# Patient Record
Sex: FEMALE | Race: WHITE | URBAN - METROPOLITAN AREA
[De-identification: names, ages, dates, MRNs, and addresses within clinical notes are randomized per-mention and may not be internally consistent; named-entity substitution may affect disease eponyms.]

---

## 2020-08-12 ENCOUNTER — TELEPHONE (OUTPATIENT)
Dept: OBGYN CLINIC | Facility: HOSPITAL | Age: 65
End: 2020-08-12

## 2020-08-12 NOTE — TELEPHONE ENCOUNTER
Patient left a message @ call center stating she needs appt w/ ortho foot specialist    I attempted to call patient but was un sussceful   No option to leave message

## 2023-07-10 ENCOUNTER — TELEPHONE (OUTPATIENT)
Dept: OBGYN CLINIC | Facility: HOSPITAL | Age: 68
End: 2023-07-10

## 2023-07-10 NOTE — TELEPHONE ENCOUNTER
Caller: Patient     Doctor: Idalia Edwards     Reason for call: Patient states she usually will get a CSI in both knees with . Patient states her insurance requires auth for the CSI for both knees. I did not see any encounters for her with .    Patient wanted to know how long will it take to get auth for the CSI     Call back#: Zulema White [phone]

## 2024-08-01 ENCOUNTER — OFFICE VISIT (OUTPATIENT)
Dept: OBGYN CLINIC | Facility: CLINIC | Age: 69
End: 2024-08-01
Payer: COMMERCIAL

## 2024-08-01 ENCOUNTER — APPOINTMENT (OUTPATIENT)
Dept: RADIOLOGY | Facility: MEDICAL CENTER | Age: 69
End: 2024-08-01
Payer: COMMERCIAL

## 2024-08-01 VITALS
BODY MASS INDEX: 35.51 KG/M2 | DIASTOLIC BLOOD PRESSURE: 71 MMHG | HEIGHT: 62 IN | SYSTOLIC BLOOD PRESSURE: 111 MMHG | WEIGHT: 193 LBS | HEART RATE: 71 BPM

## 2024-08-01 DIAGNOSIS — M25.562 ACUTE PAIN OF LEFT KNEE: Primary | ICD-10-CM

## 2024-08-01 DIAGNOSIS — M25.561 ACUTE PAIN OF RIGHT KNEE: ICD-10-CM

## 2024-08-01 DIAGNOSIS — M17.0 BILATERAL PRIMARY OSTEOARTHRITIS OF KNEE: ICD-10-CM

## 2024-08-01 DIAGNOSIS — M25.562 ACUTE PAIN OF LEFT KNEE: ICD-10-CM

## 2024-08-01 PROCEDURE — 20610 DRAIN/INJ JOINT/BURSA W/O US: CPT | Performed by: ORTHOPAEDIC SURGERY

## 2024-08-01 PROCEDURE — 99203 OFFICE O/P NEW LOW 30 MIN: CPT | Performed by: ORTHOPAEDIC SURGERY

## 2024-08-01 PROCEDURE — 73562 X-RAY EXAM OF KNEE 3: CPT

## 2024-08-01 RX ORDER — TRIAMCINOLONE ACETONIDE 40 MG/ML
80 INJECTION, SUSPENSION INTRA-ARTICULAR; INTRAMUSCULAR
Status: COMPLETED | OUTPATIENT
Start: 2024-08-01 | End: 2024-08-01

## 2024-08-01 RX ORDER — BUPIVACAINE HYDROCHLORIDE 2.5 MG/ML
4 INJECTION, SOLUTION INFILTRATION; PERINEURAL
Status: COMPLETED | OUTPATIENT
Start: 2024-08-01 | End: 2024-08-01

## 2024-08-01 RX ADMIN — TRIAMCINOLONE ACETONIDE 80 MG: 40 INJECTION, SUSPENSION INTRA-ARTICULAR; INTRAMUSCULAR at 10:45

## 2024-08-01 RX ADMIN — BUPIVACAINE HYDROCHLORIDE 4 ML: 2.5 INJECTION, SOLUTION INFILTRATION; PERINEURAL at 10:45

## 2024-08-01 NOTE — PROGRESS NOTES
Assessment/Plan:   Diagnoses and all orders for this visit:    Acute pain of left knee  -     XR knee 3 vw left non injury; Future  -     Injection Procedure Prior Authorization; Future  -     Large joint arthrocentesis: R knee    Acute pain of right knee  -     Cancel: XR knee 3 vw right non injury; Future  -     XR knee 3 vw right non injury; Future  -     Injection Procedure Prior Authorization; Future  -     Large joint arthrocentesis: R knee    Bilateral primary osteoarthritis of knee  -     Injection Procedure Prior Authorization; Future  -     Large joint arthrocentesis: R knee    Other orders  -     ascorbic acid (VITAMIN C) 1000 MG tablet; Take 1,000 mg by mouth daily       Patient has bilateral knee degenerative joint disease. She states the right is worse than the left but her daily activities are affecting even with activity modification. She would like to pursue visco supplementation injections. Today, she was offered and accepted an injection(s) of marcaine and kenalog to her Right knee(s) for symptomatic relief of pain and inflammation. Patient tolerated the treatment(s) well. Ice and post injection protocol advised. Weightbearing activities as tolerated. Visco supplementation orders were sent. She will be seen for follow-up after visco supplementation is approved. Patient expresses understanding and is in agreement with this treatment plan. The patient was given the opportunity to ask questions or present concerns.    The patient has osteoarthritis of her bilateral knees.  Her right knee is symptomatic today.  Her right knee was injected with Kenalog and Marcaine.  She wants to get approved for viscosupplementation for both knees.  Return back once complete      Subjective:   Patient ID: Peyton Perdue  1955     HPI  Patient is a 69 y.o. female who presents for bilateral knee pain evaluation. She has been having pain for a long time. She saw a doctor in [place] in April and received cortisone  injections, which she did not receive the desired relief. She was not happy with that doctor and wanted to come back to Dr. Magallanes. Today she presents with bilateral knee pain with the right being worse. She has increased pain with activity. Her pain is a 9/10 on the right and a 8/10 on the left.     The following portions of the patient's history were reviewed and updated as appropriate:  Past medical history, past surgical history, Family history, social history, current medications and allergies    Past Medical History:   Diagnosis Date    Asthma     GERD (gastroesophageal reflux disease)        Past Surgical History:   Procedure Laterality Date    CHOLECYSTECTOMY      HYSTERECTOMY      KNEE SURGERY      TONSILLECTOMY      US GUIDED THYROID BIOPSY  5/24/2023       History reviewed. No pertinent family history.    Social History     Socioeconomic History    Marital status: /Civil Union     Spouse name: None    Number of children: None    Years of education: None    Highest education level: None   Occupational History    None   Tobacco Use    Smoking status: Former     Types: Cigarettes    Smokeless tobacco: Never   Vaping Use    Vaping status: Never Used   Substance and Sexual Activity    Alcohol use: Not Currently    Drug use: Not Currently    Sexual activity: None   Other Topics Concern    None   Social History Narrative    None     Social Determinants of Health     Financial Resource Strain: Patient Declined (7/14/2024)    Received from [facility]    Overall Financial Resource Strain (CARDIA)     Difficulty of Paying Living Expenses: Patient declined   Food Insecurity: Patient Declined (7/14/2024)    Received from [facility]    Hunger Vital Sign     Worried About Running Out of Food in the Last Year: Patient declined     Ran Out of Food in the Last Year: Patient declined   Transportation Needs: No Transportation Needs (7/14/2024)    Received from [facility]    PRAPARE - Transportation     Lack of Transportation (Medical): No     Lack of Transportation (Non-Medical): No   Physical Activity: Not on file   Stress: Patient Declined (7/14/2024)    Received from [facility]    [employer] - Occupational Stress Questionnaire     Feeling of Stress : Patient declined   Social Connections: Unknown (7/14/2024)    Received from [facility]    OASIS : Social Isolation     How often do you feel lonely or isolated from those around you?: Patient declines to respond   Intimate Partner Violence: Unknown (7/14/2024)    Received from [facility]    Humiliation, Afraid, Rape, and Kick questionnaire     Fear of Current or Ex-Partner: Patient declined     Emotionally Abused: Patient declined     Physically Abused: No     Sexually Abused: No   Housing Stability: Unknown (7/14/2024)    Received from [facility]    Housing Stability Vital Sign     Unable to Pay for Housing in the Last Year: Patient declined     Number of Times Moved in the Last Year: Not on file     Homeless in the Last Year: No         Current Outpatient Medications:     albuterol (PROVENTIL HFA,VENTOLIN HFA) 90 mcg/act inhaler, INHALE 2 PUFFS BY MOUTH 4 TIMES DAILY AS NEEDED FOR WHEEZING, Disp: , Rfl:     albuterol (PROVENTIL HFA,VENTOLIN HFA) 90 mcg/act inhaler, Inhale 2 puffs 4 (four) times a day as needed, Disp: , Rfl:     ascorbic acid (VITAMIN C) 1000 MG tablet, Take 1,000 mg by mouth daily, Disp: , Rfl:     Cholecalciferol 25 MCG (1000 UT) tablet, Take 1,000 Units by mouth daily, Disp: , Rfl:     HYDROcodone-acetaminophen (NORCO) 5-325 mg per tablet, TAKE 1 TABLET BY MOUTH THREE TIMES DAILY AS NEEDED FOR PAIN . DO NOT EXCEED 3 PER 24 HOURS, Disp: , Rfl:     meloxicam (MOBIC) 7.5 mg tablet, TAKE 1 TABLET BY MOUTH ONCE DAILY AS NEEDED FOR MODERATE PAIN (PAIN SCORE 4-6), Disp: , Rfl:     pantoprazole (PROTONIX) 40  "mg tablet, Take 40 mg by mouth daily, Disp: , Rfl:     Allergies   Allergen Reactions    Amoxicillin Anaphylaxis    Other Swelling    Latex Rash     Other reaction(s): Latex       Review of Systems   Constitutional:  Negative for chills and fever.   HENT:  Negative for ear pain and sore throat.    Eyes:  Negative for pain and visual disturbance.   Respiratory:  Negative for cough and shortness of breath.    Cardiovascular:  Negative for chest pain and palpitations.   Gastrointestinal:  Negative for abdominal pain and vomiting.   Genitourinary:  Negative for dysuria and hematuria.   Musculoskeletal:  Negative for arthralgias and back pain.   Skin:  Negative for color change and rash.   Neurological:  Negative for seizures and syncope.   All other systems reviewed and are negative.       Objective:  /71 (BP Location: Left arm, Patient Position: Sitting, Cuff Size: Large)   Pulse 71   Ht 5' 2\" (1.575 m)   Wt 87.5 kg (193 lb)   BMI 35.30 kg/m²     Ortho Exam  bilateral knee(s) -   Patient ambulates with steady gait pattern  Uses No assistive device  No anatomical deformity  Skin is warm and dry to touch with no signs of erythema, ecchymosis, or infection   Mild generalized soft tissue swelling or effusion noted  ROM (0° - 115°)   Strength: 4/5 throughout  TTP over medial joint line, TTP over lateral joint line  Flexor and extensor mechanisms are intact   Knee is  stable to varus and valgus stress  - Lachman's  - Anterior Drawer, - Posterior Drawer  - Eric's  Patella tracks centrally with palpable crepitus  Calf compartments are soft and supple  - Clara's sign  2+ DP and PT pulses with brisk capillary refill to the toes  Sural, saphenous, tibial, superficial, and deep peroneal motor and sensory distributions intact  Sensation light touch intact distally      Physical Exam  Vitals and nursing note reviewed.   Constitutional:       General: She is not in acute distress.     Appearance: She is well-developed. "   HENT:      Head: Normocephalic and atraumatic.   Eyes:      Conjunctiva/sclera: Conjunctivae normal.   Cardiovascular:      Rate and Rhythm: Normal rate and regular rhythm.      Heart sounds: No murmur heard.  Pulmonary:      Effort: Pulmonary effort is normal. No respiratory distress.      Breath sounds: Normal breath sounds.   Abdominal:      Palpations: Abdomen is soft.      Tenderness: There is no abdominal tenderness.   Musculoskeletal:         General: No swelling.      Cervical back: Neck supple.   Skin:     General: Skin is warm and dry.      Capillary Refill: Capillary refill takes less than 2 seconds.   Neurological:      Mental Status: She is alert.   Psychiatric:         Mood and Affect: Mood normal.          Diagnostic Test Review:  X-Ray of bilateral knee obtained on 8/1/2024 were reviewed and demonstrate tricompartmental osteoarthritis affecting the medial tibiofemoral compartment as evidenced by joint space narrowing, osteophyte formation and subchondral sclerosis.      Large joint arthrocentesis: R knee  Universal Protocol:  Consent: Verbal consent obtained.  Risks and benefits: risks, benefits and alternatives were discussed  Consent given by: patient  Timeout called at: 8/1/2024 10:55 AM.  Patient understanding: patient states understanding of the procedure being performed  Site marked: the operative site was marked  Radiology Images displayed and confirmed. If images not available, report reviewed: imaging studies available  Patient identity confirmed: verbally with patient  Supporting Documentation  Indications: pain and joint swelling   Procedure Details  Location: knee - R knee  Needle gauge: 21 G.  Ultrasound guidance: no  Approach: anterolateral  Medications administered: 4 mL bupivacaine 0.25 %; 80 mg triamcinolone acetonide 40 mg/mL    Patient tolerance: patient tolerated the procedure well with no immediate complications  Dressing:  Sterile dressing applied          Scribe Attestation       I,:  Pamela El am acting as a scribe while in the presence of the attending physician.:       I,:  Eben Magallanes, DO personally performed the services described in this documentation    as scribed in my presence.:

## 2024-08-14 ENCOUNTER — TELEPHONE (OUTPATIENT)
Dept: OBGYN CLINIC | Facility: HOSPITAL | Age: 69
End: 2024-08-14

## 2024-08-14 NOTE — TELEPHONE ENCOUNTER
Caller: Peyton    Reason for call: Patient stated [facility]ee keep calling her concerning her visco but her knee shot from Dr. Santiago helped so she wanted to hold off on this. She told the office she wanted to cancel these. I saw referral was closed but I did reach out to our Visco Team and they will contact Randee to have this stopped so patient does not receive anymore calls.    Call back#: [phone]

## 2025-03-25 ENCOUNTER — TELEPHONE (OUTPATIENT)
Age: 70
End: 2025-03-25

## 2025-03-25 LAB
DME PARACHUTE DELIVERY DATE REQUESTED: NORMAL
DME PARACHUTE ITEM DESCRIPTION: NORMAL
DME PARACHUTE ITEM DESCRIPTION: NORMAL
DME PARACHUTE ORDER STATUS: NORMAL
DME PARACHUTE SUPPLIER NAME: NORMAL
DME PARACHUTE SUPPLIER PHONE: NORMAL

## 2025-03-25 NOTE — TELEPHONE ENCOUNTER
Caller: Patient     Doctor: Dr. Magallanes    Reason for call: Patient asking if an order for rollator can be placed in her MyChart.  She will be traveling in the summer and is hoping to get a rollator for ambulation.      Call back#: [phone]

## 2025-03-25 NOTE — TELEPHONE ENCOUNTER
Caller: Peyton    Doctor: Dr. Magallanes    Reason for call: Patient is asking if order can be changed to a rotowalker wheelchair combined. Please advise.    Call back#: [phone]